# Patient Record
Sex: MALE | Race: WHITE | NOT HISPANIC OR LATINO | Employment: OTHER | URBAN - NONMETROPOLITAN AREA
[De-identification: names, ages, dates, MRNs, and addresses within clinical notes are randomized per-mention and may not be internally consistent; named-entity substitution may affect disease eponyms.]

---

## 2017-12-07 ENCOUNTER — CONSULT (OUTPATIENT)
Dept: GASTROENTEROLOGY | Facility: CLINIC | Age: 37
End: 2017-12-07

## 2017-12-07 VITALS
DIASTOLIC BLOOD PRESSURE: 88 MMHG | BODY MASS INDEX: 30.86 KG/M2 | HEIGHT: 70 IN | SYSTOLIC BLOOD PRESSURE: 134 MMHG | HEART RATE: 97 BPM | WEIGHT: 215.6 LBS | OXYGEN SATURATION: 94 %

## 2017-12-07 DIAGNOSIS — R13.10 DYSPHAGIA, UNSPECIFIED TYPE: ICD-10-CM

## 2017-12-07 DIAGNOSIS — R14.2 BELCHING: ICD-10-CM

## 2017-12-07 DIAGNOSIS — R10.13 DYSPEPSIA: ICD-10-CM

## 2017-12-07 DIAGNOSIS — Z87.19 HISTORY OF CONSTIPATION: ICD-10-CM

## 2017-12-07 DIAGNOSIS — R14.0 BLOATING: Primary | ICD-10-CM

## 2017-12-07 PROCEDURE — 99214 OFFICE O/P EST MOD 30 MIN: CPT | Performed by: PHYSICIAN ASSISTANT

## 2017-12-07 RX ORDER — LISINOPRIL 20 MG/1
20 TABLET ORAL 2 TIMES DAILY
COMMUNITY
End: 2023-02-17

## 2017-12-07 RX ORDER — RANITIDINE 300 MG/1
300 TABLET ORAL 2 TIMES DAILY
COMMUNITY
End: 2023-02-17

## 2017-12-07 RX ORDER — HYDROCHLOROTHIAZIDE 25 MG/1
25 TABLET ORAL DAILY
COMMUNITY
End: 2023-02-17

## 2017-12-07 NOTE — PROGRESS NOTES
Chief Complaint   Patient presents with   • Vomiting     Ananda Tobin is a 37 y.o. male who presents to the office today at the request of Dr. Solitario Guzman for evaluation of Vomiting.    HPI  In 2012, he had cholecystectomy due to abnormal HIDA. He was having severe bloating and belching at that time. Dyspepsia and borborygmi was present at that time as well. Cholecystectomy did not seem to help with any of his complaints.  He has been taking herbal tea and probiotic at night to help with bowel movements. He had colonoscopy in [place] in 2013 which 'showed IBS.' No history of EGD. There is no known family history of colon cancer or colon polyps.     He reports bloating and food sitting in his throat after a meal which are his biggest complaints now. He has been taking Ranitidine 300 mg BID. Denies dysphagia. Reports worse symptoms with spicy foods such as grippos and tacos. He feels as if there is something stuck in his esophagus. He will sometimes vomit with some relief.     Now, he reports that his bowel movements are occurring every day after starting the herbal tea recommended by his wife. He has 5-6 stools in the morning as long as he drinks the tea. Stools are described as #1 at first then #4 then eventually #6 each and every day. Approx 3 years ago, he started this tea because he became very ill but it did not seem to help with any of his other symptoms.     Review of Systems   Constitutional: Negative for chills, fatigue and fever.   HENT: Positive for congestion. Negative for sore throat, trouble swallowing and voice change.    Respiratory: Positive for cough. Negative for shortness of breath and wheezing.    Cardiovascular: Negative for chest pain, palpitations and leg swelling.   Gastrointestinal: Positive for abdominal distention, constipation, nausea and vomiting. Negative for abdominal pain, anal bleeding, blood in stool, diarrhea and rectal pain.   Endocrine: Negative for cold intolerance and  "heat intolerance.   Genitourinary: Negative for difficulty urinating, frequency and urgency.   Musculoskeletal: Negative for arthralgias, back pain and myalgias.   Skin: Negative for rash and wound.   Allergic/Immunologic: Positive for environmental allergies. Negative for food allergies.   Neurological: Negative for dizziness and headaches.   Hematological: Does not bruise/bleed easily.   Psychiatric/Behavioral: Negative for sleep disturbance. The patient is not nervous/anxious.        Past Medical History:   Diagnosis Date   • Hypertension        Past Surgical History:   Procedure Laterality Date   • CHOLECYSTECTOMY     • COLONOSCOPY     • HERNIA REPAIR         Family History   Problem Relation Age of Onset   • Hypertension Father    • Kidney disease Father        Social History   Substance Use Topics   • Smoking status: Never Smoker   • Smokeless tobacco: Never Used   • Alcohol use No       CURRENT MEDICATION:  •  hydrochlorothiazide (HYDRODIURIL) 25 MG tablet, Take 25 mg by mouth Daily., Disp: , Rfl:   •  lisinopril (PRINIVIL,ZESTRIL) 20 MG tablet, Take 20 mg by mouth 2 (Two) Times a Day., Disp: , Rfl:   •  Probiotic Product (PROBIOTIC ADVANCED PO), Take  by mouth., Disp: , Rfl:   •  raNITIdine (ZANTAC) 300 MG tablet, Take 300 mg by mouth 2 (Two) Times a Day., Disp: , Rfl:     ALLERGIES:  Review of patient's allergies indicates no known allergies.    VISIT VITALS:  /88  Pulse 97  Ht 177.8 cm (70\")  Wt 97.8 kg (215 lb 9.6 oz)  SpO2 94%  BMI 30.94 kg/m2    Physical Exam   Constitutional: He is oriented to person, place, and time. He appears well-developed and well-nourished. No distress.   HENT:   Head: Normocephalic and atraumatic.   Right Ear: External ear normal.   Left Ear: External ear normal.   Nose: Nose normal.   Mouth/Throat: Oropharynx is clear and moist.   Eyes: Conjunctivae and EOM are normal. Right eye exhibits no discharge. Left eye exhibits no discharge. No scleral icterus.   Neck: Normal " range of motion. Neck supple.   Cardiovascular: Normal rate, regular rhythm and normal heart sounds.  Exam reveals no gallop and no friction rub.    No murmur heard.  Pulmonary/Chest: Effort normal and breath sounds normal. No respiratory distress. He has no wheezes. He has no rales. He exhibits no tenderness.   Abdominal: Soft. Normal appearance and bowel sounds are normal. He exhibits no distension, no ascites and no mass. There is no tenderness. There is no rigidity and no guarding. No hernia.   Musculoskeletal: Normal range of motion. He exhibits no edema or deformity.   Neurological: He is alert and oriented to person, place, and time. He exhibits normal muscle tone. Coordination normal.   Skin: Skin is warm and dry. No rash noted. No erythema. No pallor.   Psychiatric: He has a normal mood and affect. His behavior is normal. Judgment and thought content normal.   Nursing note and vitals reviewed.      Assessment/Plan     1. Bloating    2. Belching    3. Dyspepsia    4. History of constipation    5. Dysphagia, unspecified type      Abdominal film has been ordered due to current complaints of bloating and belching.    He will need an esophagogastroduodenoscopy with possible dilatation of the esophagus performed with IV general sedation. All of the risks, benefits and alternatives of this procedure have been discussed with him, all of his questions have been answered and he has elected to proceed. He should follow up in the office after this procedure to discuss the results and further recommendations can be made at that time.          Return for follow up after procedure.      Electronically signed 12/20/2017 11:10 PM  Laly Galvez PA-C, [facility]

## 2018-01-09 PROBLEM — R10.13 DYSPEPSIA: Status: ACTIVE | Noted: 2018-01-09

## 2018-01-09 PROBLEM — R14.0 BLOATING: Status: ACTIVE | Noted: 2018-01-09

## 2018-01-09 PROBLEM — R13.10 DYSPHAGIA: Status: ACTIVE | Noted: 2018-01-09

## 2018-01-09 PROBLEM — R14.2 BELCHING: Status: ACTIVE | Noted: 2018-01-09

## 2018-01-09 PROBLEM — Z87.19 HISTORY OF CONSTIPATION: Status: ACTIVE | Noted: 2018-01-09

## 2018-01-16 ENCOUNTER — HOSPITAL ENCOUNTER (OUTPATIENT)
Dept: GENERAL RADIOLOGY | Facility: HOSPITAL | Age: 38
Discharge: HOME | End: 2018-01-16
Admitting: PHYSICIAN ASSISTANT

## 2018-01-16 DIAGNOSIS — R10.13 DYSPEPSIA: ICD-10-CM

## 2018-01-16 DIAGNOSIS — R14.0 BLOATING: ICD-10-CM

## 2018-01-16 DIAGNOSIS — Z87.19 HISTORY OF CONSTIPATION: ICD-10-CM

## 2018-01-16 DIAGNOSIS — R14.2 BELCHING: ICD-10-CM

## 2018-01-16 PROCEDURE — 74019 RADEX ABDOMEN 2 VIEWS: CPT

## 2018-01-16 PROCEDURE — 74019 RADEX ABDOMEN 2 VIEWS: CPT | Performed by: RADIOLOGY

## 2018-01-23 ENCOUNTER — TELEPHONE (OUTPATIENT)
Dept: GASTROENTEROLOGY | Facility: CLINIC | Age: 38
End: 2018-01-23

## 2018-01-23 NOTE — TELEPHONE ENCOUNTER
Spoke with patient and let him know his abdominal film was normal. He stated that he would call back later to reschedule his procedure.

## 2018-01-23 NOTE — TELEPHONE ENCOUNTER
I noticed patient did not complete EGD. His abdominal film was normal, no constipation noted. Would he like to reschedule procedure then sched f/u after to discuss?

## 2021-01-12 ENCOUNTER — HOSPITAL ENCOUNTER (OUTPATIENT)
Dept: CT IMAGING | Facility: HOSPITAL | Age: 41
Discharge: HOME | End: 2021-01-12
Admitting: PHYSICIAN ASSISTANT

## 2021-01-12 ENCOUNTER — TRANSCRIBE ORDERS (OUTPATIENT)
Dept: ADMINISTRATIVE | Facility: HOSPITAL | Age: 41
End: 2021-01-12

## 2021-01-12 DIAGNOSIS — R10.9 ABDOMINAL PAIN, UNSPECIFIED ABDOMINAL LOCATION: ICD-10-CM

## 2021-01-12 DIAGNOSIS — R31.0 GROSS HEMATURIA: Primary | ICD-10-CM

## 2021-01-12 DIAGNOSIS — R31.0 GROSS HEMATURIA: ICD-10-CM

## 2021-01-12 PROCEDURE — 74176 CT ABD & PELVIS W/O CONTRAST: CPT

## 2021-01-12 PROCEDURE — 74176 CT ABD & PELVIS W/O CONTRAST: CPT | Performed by: RADIOLOGY

## 2021-03-18 ENCOUNTER — BULK ORDERING (OUTPATIENT)
Dept: CASE MANAGEMENT | Facility: OTHER | Age: 41
End: 2021-03-18

## 2021-03-18 DIAGNOSIS — Z23 IMMUNIZATION DUE: ICD-10-CM

## 2022-05-11 ENCOUNTER — TRANSCRIBE ORDERS (OUTPATIENT)
Dept: ADMINISTRATIVE | Facility: HOSPITAL | Age: 42
End: 2022-05-11

## 2022-05-11 DIAGNOSIS — R74.8 ELEVATED LIVER ENZYMES: Primary | ICD-10-CM

## 2022-06-02 ENCOUNTER — HOSPITAL ENCOUNTER (OUTPATIENT)
Dept: ULTRASOUND IMAGING | Facility: HOSPITAL | Age: 42
Discharge: HOME | End: 2022-06-02
Admitting: EMERGENCY MEDICINE

## 2022-06-02 DIAGNOSIS — R74.8 ELEVATED LIVER ENZYMES: ICD-10-CM

## 2022-06-02 PROCEDURE — 76705 ECHO EXAM OF ABDOMEN: CPT | Performed by: RADIOLOGY

## 2022-06-02 PROCEDURE — 76705 ECHO EXAM OF ABDOMEN: CPT

## 2023-02-17 ENCOUNTER — OFFICE VISIT (OUTPATIENT)
Dept: UROLOGY | Facility: CLINIC | Age: 43
End: 2023-02-17
Payer: COMMERCIAL

## 2023-02-17 VITALS
BODY MASS INDEX: 30.72 KG/M2 | WEIGHT: 214.6 LBS | SYSTOLIC BLOOD PRESSURE: 122 MMHG | DIASTOLIC BLOOD PRESSURE: 76 MMHG | TEMPERATURE: 98 F | HEIGHT: 70 IN

## 2023-02-17 DIAGNOSIS — R79.89 LOW TESTOSTERONE IN MALE: Primary | ICD-10-CM

## 2023-02-17 PROCEDURE — 99204 OFFICE O/P NEW MOD 45 MIN: CPT | Performed by: NURSE PRACTITIONER

## 2023-02-17 RX ORDER — DICLOFENAC SODIUM 75 MG/1
1 TABLET, DELAYED RELEASE ORAL EVERY 12 HOURS SCHEDULED
COMMUNITY
Start: 2023-02-08

## 2023-02-17 RX ORDER — TIRZEPATIDE 2.5 MG/.5ML
INJECTION, SOLUTION SUBCUTANEOUS
COMMUNITY

## 2023-02-17 NOTE — PROGRESS NOTES
Office Visit Low Testosterone      Patient Name: Ananda Tobin  : 1980   MRN: [id number]     Chief Complaint:   Chief Complaint   Patient presents with   • Low testosterone       Referring Provider: Viki Woo PA-C    History of Present Illness: Ananda Tobin is a 42 y.o. male who presents today with low testosterone.  The serum test was collected due to the following complaints: fatigue  Having a lot of joint pain started arthritis medication     Low libido   yes  Low energy   yes  Poor sleep   no  Mental clarity issues  yes    History of depression? no  Erectile dysfunction?  no    Any potential interest in conception?  no    Objective      Review of System:   As noted in HPI    Past Medical History:   Past Medical History:   Diagnosis Date   • Hypertension        Past Surgical History:   Past Surgical History:   Procedure Laterality Date   • CHOLECYSTECTOMY     • COLONOSCOPY     • HERNIA REPAIR         Family History:   Family History   Problem Relation Age of Onset   • Hypertension Father    • Kidney disease Father        Social History:   Social History     Socioeconomic History   • Marital status:    Tobacco Use   • Smoking status: Never   • Smokeless tobacco: Never   Substance and Sexual Activity   • Alcohol use: No   • Drug use: No       Medications:     Current Outpatient Medications:   •  diclofenac (VOLTAREN) 75 MG EC tablet, Take 1 tablet by mouth Every 12 (Twelve) Hours., Disp: , Rfl:   •  Probiotic Product (PROBIOTIC ADVANCED PO), Take  by mouth., Disp: , Rfl:   •  Tirzepatide (Mounjaro) 2.5 MG/0.5ML solution pen-injector, Inject  under the skin into the appropriate area as directed., Disp: , Rfl:     Allergies:   No Known Allergies    Objective     Physical Exam:   Vital Signs:   Vitals:    23 1520   BP: 122/76   BP Location: Right arm   Patient Position: Sitting   Cuff Size: Adult   Temp: 98 °F (36.7 °C)   TempSrc: Temporal   Weight: 97.3 kg (214 lb 9.6 oz)  "  Height: 177.8 cm (70\")     Body mass index is 30.79 kg/m².     Constitutional: NAD, WDWN.   Neurological: A + O x 3    Psych: normal mood and affect        Labs  No results found for: TESTOSTERONE    No results found for: PSA    No results found for: WBC, HGB, HCT, MCV, PLT    Assessment / Plan    Assessment  Mr. Tobin is a 42 y.o. male with low testosterone.  Today we discussed the role testosterone plays for a male patient. I have indicated that low testosterone can be associated with metabolic syndrome, erectile dysfunction, coronary artery disease, diabetes, depression, osteoporosis, fatigue, low sex drive, poor sleep, high cholesterol, increased abdominal fat, muscle loss, irritability, hot flashes, and inability to concentrate.     Treatment options were discussed including SERMs, aromatase inhibitors, topical gel or patch, oral troches, nasal spray, testosterone injections every 2-3 weeks, long-acting injections every 10 weeks, and testosterone pellets placed in clinic every 4-6 months.    I explained the risks, benefits, and alternatives to testosterone therapies. I informed him of the potential SEs of chest and leg swelling, increased acne, change in mood, polycythemia, and worsening of undiagnosed prostate cancer.     Plan  1.  Repeat TT and free T  2.  LH, estradiol, and PSA ordered  3.  Patient will have labs drawn at the clinic in [place] hometown and have faxed to me  4.  After lab results confirm hypogonadism plan to start weekly testosterone therapy.    Follow Up:   Return in about 10 days (around 2/27/2023).    REYMUNDO Ayon, NP-C  [facility] [place]   "

## 2023-02-27 ENCOUNTER — OFFICE VISIT (OUTPATIENT)
Dept: UROLOGY | Facility: CLINIC | Age: 43
End: 2023-02-27
Payer: COMMERCIAL

## 2023-02-27 DIAGNOSIS — E29.1 HYPOGONADISM IN MALE: Primary | ICD-10-CM

## 2023-02-27 PROCEDURE — 99443 PR PHYS/QHP TELEPHONE EVALUATION 21-30 MIN: CPT | Performed by: NURSE PRACTITIONER

## 2023-02-27 RX ORDER — BLOOD PRESSURE TEST KIT
KIT MISCELLANEOUS
Qty: 100 EACH | Refills: 11 | Status: SHIPPED | OUTPATIENT
Start: 2023-02-27

## 2023-02-27 RX ORDER — TESTOSTERONE CYPIONATE 200 MG/ML
100 INJECTION, SOLUTION INTRAMUSCULAR
Qty: 2 ML | Refills: 0 | Status: SHIPPED | OUTPATIENT
Start: 2023-02-27 | End: 2023-04-07 | Stop reason: SDUPTHER

## 2023-02-27 NOTE — PROGRESS NOTES
Office Visit Established Male Patient     Patient Name: Ananda Tobin  : 1980   MRN: [id number]     Chief Complaint: No chief complaint on file.      History of Present Illness: Mr. Ananda Tobin is a 42 y.o. male who agrees to visit via telephone to discuss his repeat testosterone labs.  We discussed that at last visit and initially he had reported he did not have any issues but after going home and thinking about this he does report decreased libido and mild ED symptoms.      Subjective      Review of System:     Past Medical History:   Past Medical History:   Diagnosis Date   • Hypertension        Past Surgical History:   Past Surgical History:   Procedure Laterality Date   • CHOLECYSTECTOMY     • COLONOSCOPY     • HERNIA REPAIR         Family History:   Family History   Problem Relation Age of Onset   • Hypertension Father    • Kidney disease Father        Social History:   Social History     Socioeconomic History   • Marital status:    Tobacco Use   • Smoking status: Never   • Smokeless tobacco: Never   Substance and Sexual Activity   • Alcohol use: No   • Drug use: No       Medications:     Current Outpatient Medications:   •  diclofenac (VOLTAREN) 75 MG EC tablet, Take 1 tablet by mouth Every 12 (Twelve) Hours., Disp: , Rfl:   •  Probiotic Product (PROBIOTIC ADVANCED PO), Take  by mouth., Disp: , Rfl:   •  Tirzepatide (Mounjaro) 2.5 MG/0.5ML solution pen-injector, Inject  under the skin into the appropriate area as directed., Disp: , Rfl:     Allergies:   No Known Allergies    Objective     Physical Exam:   Vital Signs: There were no vitals filed for this visit.  There is no height or weight on file to calculate BMI.       Assessment / Plan      Assessment/Plan:   Diagnoses and all orders for this visit:    1. Hypogonadism in male (Primary)    42-year-old male with a history of low testosterone with repeated labs today have confirmed hypogonadism.  We discussed testosterone is 139  and his LH, estradiol, and PSA all within normal limits.  He would like to start a trial of testosterone cypionate.  We discussed starting dosage at 100 mg weekly on her previous visit we had discussed risks and side effects of testosterone therapy.  He wishes to proceed with a trial of testosterone cypionate    1. Start testosterone cypionate 100 mg weekly  2. Repeat TT and H&H 3 months prior to next visit    Total of 24 minutes spent via telephone with patient discussing lab results, testosterone therapy, and engaging the chart.    Follow Up:   No follow-ups on file.    REYMUNDO Ayon,NP-C  [facility] [place]

## 2023-04-07 DIAGNOSIS — E29.1 HYPOGONADISM IN MALE: ICD-10-CM

## 2023-04-07 RX ORDER — TESTOSTERONE CYPIONATE 200 MG/ML
100 INJECTION, SOLUTION INTRAMUSCULAR
Qty: 2 ML | Refills: 0 | Status: SHIPPED | OUTPATIENT
Start: 2023-04-07

## 2023-05-23 ENCOUNTER — TELEPHONE (OUTPATIENT)
Dept: UROLOGY | Facility: CLINIC | Age: 43
End: 2023-05-23
Payer: COMMERCIAL

## 2023-05-24 ENCOUNTER — OFFICE VISIT (OUTPATIENT)
Dept: UROLOGY | Facility: CLINIC | Age: 43
End: 2023-05-24
Payer: COMMERCIAL

## 2023-05-24 DIAGNOSIS — E29.1 HYPOGONADISM IN MALE: Primary | ICD-10-CM

## 2023-05-24 PROCEDURE — 99442 PR PHYS/QHP TELEPHONE EVALUATION 11-20 MIN: CPT | Performed by: NURSE PRACTITIONER

## 2023-05-24 NOTE — PROGRESS NOTES
Patient agrees to visit via telephone    We discussed recent total testosterone levels have improved from 153 to 324 and free testosterone is 29%, patient is feeling well has less fatigue on testosterone cypionate.  We discussed I did not receive hematocrit and hemoglobin labs this is an important component when doing testosterone therapy to confirm patient is not having issues with polycythemia.  He will call the lab to see if they had those results and get them sent to our office.  Once receiving his hematocrit and hemoglobin if he does not have polycythemia we will consider increasing his dosage to 200 mg weekly of testosterone cypionate to get him in a more therapeutic range    Plan: Patient will follow-up in 6 months with repeat TT, hematocrit and hemoglobin    I spent a total of 11 minutes via telephone with the patient and the chart engaging in data gathering and interpretation, patient interaction, as well as counseling on the risks, benefits, and alternatives of the therapy and coordinating care.

## 2023-07-19 DIAGNOSIS — E29.1 HYPOGONADISM IN MALE: ICD-10-CM

## 2023-08-28 DIAGNOSIS — E29.1 HYPOGONADISM IN MALE: ICD-10-CM

## 2023-08-28 RX ORDER — TESTOSTERONE CYPIONATE 200 MG/ML
INJECTION, SOLUTION INTRAMUSCULAR
Qty: 2 ML | Refills: 0 | Status: SHIPPED | OUTPATIENT
Start: 2023-08-28

## 2023-09-29 RX ORDER — TESTOSTERONE CYPIONATE 200 MG/ML
INJECTION, SOLUTION INTRAMUSCULAR
Qty: 2 ML | Refills: 0 | Status: SHIPPED | OUTPATIENT
Start: 2023-09-29

## 2023-09-29 NOTE — TELEPHONE ENCOUNTER
Caller: [address] [place] [address] - [phone] [facility] [phone]    Relationship: PT    Best call back number: [phone]    Requested Prescriptions:   Requested Prescriptions     Pending Prescriptions Disp Refills    Testosterone Cypionate (DEPOTESTOTERONE CYPIONATE) 200 MG/ML injection [Pharmacy Med Name: testosterone cypionate 200 mg/mL intramuscular oil] 2 mL 0     Sig: inject 0.5ml INTO THE MUSCLE AS directed EVERY 7 DAYS        Pharmacy where request should be sent: [address] [place] [address] - [phone] [facility] [phone] FX     Last office visit with prescribing clinician: 5/24/2023   Last telemedicine visit with prescribing clinician: Visit date not found   Next office visit with prescribing clinician: 8/28/2023     Additional details provided by patient: PT IS COMPLETELY OUT OF MEDICATION     Does the patient have less than a 3 day supply:  [x] Yes  [] No    Would you like a call back once the refill request has been completed: [x] Yes [] No    If the office needs to give you a call back, can they leave a voicemail: [x] Yes [] No    Eulalio Reina Rep   09/29/23 14:28 EDT

## 2023-11-01 DIAGNOSIS — E29.1 HYPOGONADISM IN MALE: ICD-10-CM

## 2023-11-01 RX ORDER — TESTOSTERONE CYPIONATE 200 MG/ML
INJECTION, SOLUTION INTRAMUSCULAR
Qty: 2 ML | Refills: 0 | Status: SHIPPED | OUTPATIENT
Start: 2023-11-01

## 2023-11-21 ENCOUNTER — TELEPHONE (OUTPATIENT)
Dept: UROLOGY | Facility: CLINIC | Age: 43
End: 2023-11-21
Payer: COMMERCIAL

## 2023-11-28 ENCOUNTER — LAB (OUTPATIENT)
Dept: LAB | Facility: HOSPITAL | Age: 43
End: 2023-11-28
Payer: COMMERCIAL

## 2023-11-28 ENCOUNTER — OFFICE VISIT (OUTPATIENT)
Dept: UROLOGY | Facility: CLINIC | Age: 43
End: 2023-11-28
Payer: COMMERCIAL

## 2023-11-28 VITALS
SYSTOLIC BLOOD PRESSURE: 118 MMHG | DIASTOLIC BLOOD PRESSURE: 76 MMHG | HEIGHT: 70 IN | WEIGHT: 214 LBS | BODY MASS INDEX: 30.64 KG/M2

## 2023-11-28 DIAGNOSIS — E29.1 HYPOGONADISM IN MALE: Primary | ICD-10-CM

## 2023-11-28 LAB
HCT VFR BLD AUTO: 50 % (ref 37.5–51)
HGB BLD-MCNC: 16.9 G/DL (ref 13–17.7)
PSA SERPL-MCNC: 1.03 NG/ML (ref 0–4)
TESTOST SERPL-MCNC: 1228 NG/DL (ref 249–836)

## 2023-11-28 PROCEDURE — 36415 COLL VENOUS BLD VENIPUNCTURE: CPT | Performed by: NURSE PRACTITIONER

## 2023-11-28 PROCEDURE — 85018 HEMOGLOBIN: CPT | Performed by: NURSE PRACTITIONER

## 2023-11-28 PROCEDURE — 84403 ASSAY OF TOTAL TESTOSTERONE: CPT | Performed by: NURSE PRACTITIONER

## 2023-11-28 PROCEDURE — 85014 HEMATOCRIT: CPT | Performed by: NURSE PRACTITIONER

## 2023-11-28 PROCEDURE — 84153 ASSAY OF PSA TOTAL: CPT | Performed by: NURSE PRACTITIONER

## 2023-11-28 RX ORDER — NEEDLES, DISPOSABLE 25GX5/8"
NEEDLE, DISPOSABLE MISCELLANEOUS
Qty: 50 EACH | Refills: 2 | Status: SHIPPED | OUTPATIENT
Start: 2023-11-28

## 2023-11-28 NOTE — PROGRESS NOTES
"       Office Visit Established Male Patient     Patient Name: Ananda Tobin  : 1980   MRN: [id number]     Chief Complaint:   Chief Complaint   Patient presents with    Follow-up     hypogonadism       History of Present Illness: Mr. Ananda Tobin is a 43 y.o. male who presents today for follow up with hypogonadism.    night.  His last injection  Helping with fatigue and lost 40 lbs since starting testosterone feels better in general       Subjective      Review of System:   As noted in HPI    Past Medical History:   Past Medical History:   Diagnosis Date    Hypertension        Past Surgical History:   Past Surgical History:   Procedure Laterality Date    CHOLECYSTECTOMY      COLONOSCOPY      HERNIA REPAIR         Family History:   Family History   Problem Relation Age of Onset    Hypertension Father     Kidney disease Father        Social History:   Social History     Socioeconomic History    Marital status:    Tobacco Use    Smoking status: Never    Smokeless tobacco: Never   Substance and Sexual Activity    Alcohol use: No    Drug use: No       Medications:     Current Outpatient Medications:     Needle, Disp, 18G X 1-1/2\" misc, Use for drawing up the medication, Disp: 50 each, Rfl: 3    Syringe, Disposable, 3 ML misc, Use 1 syringe 1 (One) Time Per Week., Disp: 100 each, Rfl: 11    Alcohol Swabs pads, Clean area prior to injection, Disp: 100 each, Rfl: 11    diclofenac (VOLTAREN) 75 MG EC tablet, Take 1 tablet by mouth Every 12 (Twelve) Hours., Disp: , Rfl:     Needle, Disp, (BD Disp Needle) 23G X 1\" misc, Use to inject one time weekly, Disp: 50 each, Rfl: 2    Probiotic Product (PROBIOTIC ADVANCED PO), Take  by mouth., Disp: , Rfl:     Testosterone Cypionate (DEPOTESTOTERONE CYPIONATE) 200 MG/ML injection, inject 0.5ml INTO THE MUSCLE every 7 DAYS, Disp: 2 mL, Rfl: 0    Tirzepatide (Mounjaro) 2.5 MG/0.5ML solution pen-injector, Inject  under the skin into the appropriate area as " "directed., Disp: , Rfl:     Allergies:   No Known Allergies    Objective     Physical Exam:   Vital Signs:   Vitals:    11/28/23 0838   BP: 118/76   BP Location: Left arm   Patient Position: Sitting   Cuff Size: Adult   Weight: 97.1 kg (214 lb)   Height: 177.8 cm (70\")     Body mass index is 30.71 kg/m².     Physical Exam  Constitutional: NAD, WDWN.   Neurological: A + O x 3.   Psychiatric:  Normal mood and affect    Labs  Brief Urine Lab Results       None            No results found for: \"GLUCOSE\", \"CALCIUM\", \"NA\", \"K\", \"CO2\", \"CL\", \"BUN\", \"CREATININE\", \"EGFRIFAFRI\", \"EGFRIFNONA\", \"BCR\", \"ANIONGAP\"    No results found for: \"WBC\", \"HGB\", \"HCT\", \"MCV\", \"PLT\"         Radiographic Studies  No Images in the past 120 days found..    I have reviewed the above labs and imaging.     Assessment / Plan      Assessment/Plan:   Diagnoses and all orders for this visit:    1. Hypogonadism in male (Primary)  -     Hemoglobin & Hematocrit, Blood  -     Testosterone  -     PSA DIAGNOSTIC  -     Syringe, Disposable, 3 ML misc; Use 1 syringe 1 (One) Time Per Week.  Dispense: 100 each; Refill: 11  -     Needle, Disp, 18G X 1-1/2\" misc; Use for drawing up the medication  Dispense: 50 each; Refill: 3  -     Needle, Disp, (BD Disp Needle) 23G X 1\" misc; Use to inject one time weekly  Dispense: 50 each; Refill: 2    43-year-old male with hypogonadism here for 6-month follow-up feels overall he is improving on testosterone cypionate would like to consider an increase in dosage if his testosterone labs are still on the lower end of normal limits.    Obtain TT, PSA, hematocrit and hemoglobin today  Obtain TT hematocrit and hemoglobin 6 months  Consider increasing testosterone cypionate to 200 mg weekly per lab results    Follow Up:   Return in about 6 months (around 5/28/2024) for Follow up Kirby.    REYMUNDO Ayon,NP-C  [facility] [place]   "

## 2023-12-04 DIAGNOSIS — E29.1 HYPOGONADISM IN MALE: ICD-10-CM

## 2023-12-04 RX ORDER — TESTOSTERONE CYPIONATE 200 MG/ML
INJECTION, SOLUTION INTRAMUSCULAR
Qty: 2 ML | Refills: 0 | Status: SHIPPED | OUTPATIENT
Start: 2023-12-04

## 2024-01-16 DIAGNOSIS — E29.1 HYPOGONADISM IN MALE: ICD-10-CM

## 2024-01-16 RX ORDER — TESTOSTERONE CYPIONATE 200 MG/ML
INJECTION, SOLUTION INTRAMUSCULAR
Qty: 2 ML | Refills: 0 | Status: SHIPPED | OUTPATIENT
Start: 2024-01-16

## 2024-02-29 DIAGNOSIS — E29.1 HYPOGONADISM IN MALE: ICD-10-CM

## 2024-02-29 RX ORDER — TESTOSTERONE CYPIONATE 200 MG/ML
100 INJECTION, SOLUTION INTRAMUSCULAR
Qty: 2 ML | Refills: 0 | Status: SHIPPED | OUTPATIENT
Start: 2024-02-29

## 2024-04-28 DIAGNOSIS — E29.1 HYPOGONADISM IN MALE: ICD-10-CM

## 2024-04-29 RX ORDER — NEEDLES, DISPOSABLE 25GX5/8"
NEEDLE, DISPOSABLE MISCELLANEOUS
Qty: 50 EACH | Refills: 2 | Status: SHIPPED | OUTPATIENT
Start: 2024-04-29

## 2024-04-29 RX ORDER — TESTOSTERONE CYPIONATE 200 MG/ML
100 INJECTION, SOLUTION INTRAMUSCULAR
Qty: 2 ML | Refills: 0 | Status: SHIPPED | OUTPATIENT
Start: 2024-04-29

## 2024-05-21 DIAGNOSIS — E29.1 HYPOGONADISM IN MALE: Primary | ICD-10-CM

## 2024-05-28 ENCOUNTER — OFFICE VISIT (OUTPATIENT)
Dept: UROLOGY | Facility: CLINIC | Age: 44
End: 2024-05-28
Payer: COMMERCIAL

## 2024-05-28 VITALS
HEIGHT: 70 IN | BODY MASS INDEX: 30.64 KG/M2 | SYSTOLIC BLOOD PRESSURE: 130 MMHG | WEIGHT: 214 LBS | DIASTOLIC BLOOD PRESSURE: 82 MMHG

## 2024-05-28 DIAGNOSIS — E29.1 HYPOGONADISM IN MALE: Primary | ICD-10-CM

## 2024-05-28 PROCEDURE — 99213 OFFICE O/P EST LOW 20 MIN: CPT | Performed by: NURSE PRACTITIONER

## 2024-05-28 RX ORDER — SYRINGE WITH NEEDLE, 1 ML 25GX5/8"
SYRINGE, EMPTY DISPOSABLE MISCELLANEOUS
COMMUNITY
Start: 2024-04-29

## 2024-05-28 RX ORDER — MECOBALAMIN 5000 MCG
TABLET,DISINTEGRATING ORAL
COMMUNITY
Start: 2013-01-31

## 2024-05-28 NOTE — PROGRESS NOTES
"       Office Visit Established Male Patient     Patient Name: Ananda Tobin  : 1980   MRN: [id number]     Chief Complaint:   Chief Complaint   Patient presents with    Hypogonadism       History of Present Illness: Mr. Ananda Tobin is a 43 y.o. male who presents today for follow up with hypogonadism.  Injected on Tuesday and had to get labs done on Wednesday this time   Doing well no complainst of symptoms or siede effects  No difficulty with injecting         Subjective      Review of System:   As noted in HPI    Past Medical History:   Past Medical History:   Diagnosis Date    Hypertension        Past Surgical History:   Past Surgical History:   Procedure Laterality Date    CHOLECYSTECTOMY      COLONOSCOPY      HERNIA REPAIR         Family History:   Family History   Problem Relation Age of Onset    Hypertension Father     Kidney disease Father        Social History:   Social History     Socioeconomic History    Marital status:    Tobacco Use    Smoking status: Never     Passive exposure: Never    Smokeless tobacco: Never   Vaping Use    Vaping status: Never Used   Substance and Sexual Activity    Alcohol use: No    Drug use: No       Medications:     Current Outpatient Medications:     B-D 3CC LUER-VENKATESH SYR 36VG0-5/2 18G X 1-1/2\" 3 ML misc, USE AS directed TO draw UP testosterone ONCE WEEKLY AS DIRECTED, Disp: , Rfl:     B-D 3CC LUER-VENKATESH SYR 25GX5/8\" 25G X 5/8\" 3 ML misc, USE AS directed TO inject testosterone ONCE WEEKLY AS DIRECTED, Disp: , Rfl:     lansoprazole (Prevacid) 15 MG capsule, Take  by mouth., Disp: , Rfl:     Alcohol Swabs pads, Clean area prior to injection, Disp: 100 each, Rfl: 11    Needle, Disp, (BD Disp Needle) 23G X 1\" misc, Use to inject one time weekly, Disp: 50 each, Rfl: 2    Needle, Disp, 18G X 1-1/2\" misc, Use for drawing up the medication, Disp: 50 each, Rfl: 3    Probiotic Product (PROBIOTIC ADVANCED PO), Take  by mouth., Disp: , Rfl:     Syringe, Disposable, 3 ML " "misc, Use 1 syringe 1 (One) Time Per Week., Disp: 100 each, Rfl: 11    Testosterone Cypionate (DEPOTESTOTERONE CYPIONATE) 200 MG/ML injection, Inject 0.5 mL into the appropriate muscle as directed by prescriber Every 7 (Seven) Days. Please call for refills with last injection, Disp: 2 mL, Rfl: 0    Tirzepatide (Mounjaro) 2.5 MG/0.5ML solution pen-injector, Inject  under the skin into the appropriate area as directed., Disp: , Rfl:     Allergies:   No Known Allergies    Objective     Physical Exam:   Vital Signs:   Vitals:    05/28/24 0908   BP: 130/82   BP Location: Left arm   Patient Position: Sitting   Cuff Size: Adult   Weight: 97.1 kg (214 lb)   Height: 177.8 cm (70\")     Body mass index is 30.71 kg/m².     Physical Exam  Vitals and nursing note reviewed.   Constitutional:       General: He is not in acute distress.     Appearance: Normal appearance. He is normal weight. He is not ill-appearing.   Pulmonary:      Effort: Pulmonary effort is normal.   Skin:     General: Skin is warm and dry.   Neurological:      General: No focal deficit present.      Mental Status: He is alert and oriented to person, place, and time.   Psychiatric:         Mood and Affect: Mood normal.         Behavior: Behavior normal.        Labs  Brief Urine Lab Results       None            No results found for: \"GLUCOSE\", \"CALCIUM\", \"NA\", \"K\", \"CO2\", \"CL\", \"BUN\", \"CREATININE\", \"EGFRIFAFRI\", \"EGFRIFNONA\", \"BCR\", \"ANIONGAP\"    Lab Results   Component Value Date    HGB 16.9 11/28/2023    HCT 50.0 11/28/2023            Radiographic Studies  No Images in the past 120 days found..    I have reviewed the above labs and imaging.       Assessment / Plan      Assessment/Plan:   Diagnoses and all orders for this visit:    1. Hypogonadism in male (Primary)    42 yo male here to follow-up with hypogonadism he is doing well not experiencing symptoms on TRT or side effects.  Labs are pending we discussed no plans for dosage change.  Will have patient " follow-up in 6 months with routine TRT labs.  We also discussed if hematocrit comes back greater than 50 will have him start therapeutic phlebotomy he voiced understanding.    Continue testosterone cypionate 100 mg weekly  Obtain TT, hematocrit and hemoglobin 6-months  Anticipate therapeutic phlebotomy pending labs    Follow Up:   Return in about 6 months (around 11/28/2024) for Follow up Kirby.    REYMUNDO Ayon,NP-C  [facility] [place]

## 2024-05-31 ENCOUNTER — TELEPHONE (OUTPATIENT)
Dept: UROLOGY | Facility: CLINIC | Age: 44
End: 2024-05-31

## 2024-05-31 NOTE — TELEPHONE ENCOUNTER
[facility] received a fax that requires your attention. The document has been indexed to the patient’s chart for your review.      Reason for sending: NEEDS REVIEW    Documents Description: LABS    Name of Sender: [facility]    Date Indexed: 05/31/24

## 2024-06-03 DIAGNOSIS — E29.1 HYPOGONADISM IN MALE: ICD-10-CM

## 2024-06-03 RX ORDER — TESTOSTERONE CYPIONATE 200 MG/ML
100 INJECTION, SOLUTION INTRAMUSCULAR
Qty: 2 ML | Refills: 0 | Status: SHIPPED | OUTPATIENT
Start: 2024-06-03

## 2024-07-01 DIAGNOSIS — E29.1 HYPOGONADISM IN MALE: ICD-10-CM

## 2024-07-02 RX ORDER — TESTOSTERONE CYPIONATE 200 MG/ML
100 INJECTION, SOLUTION INTRAMUSCULAR
Qty: 2 ML | Refills: 0 | Status: SHIPPED | OUTPATIENT
Start: 2024-07-02

## 2024-08-05 DIAGNOSIS — E29.1 HYPOGONADISM IN MALE: ICD-10-CM

## 2024-08-06 RX ORDER — TESTOSTERONE CYPIONATE 200 MG/ML
100 INJECTION, SOLUTION INTRAMUSCULAR
Qty: 2 ML | Refills: 0 | Status: SHIPPED | OUTPATIENT
Start: 2024-08-06

## 2024-08-21 DIAGNOSIS — E29.1 HYPOGONADISM IN MALE: ICD-10-CM

## 2024-08-21 RX ORDER — NEEDLES, DISPOSABLE 25GX5/8"
NEEDLE, DISPOSABLE MISCELLANEOUS
Qty: 50 EACH | Refills: 2 | Status: SHIPPED | OUTPATIENT
Start: 2024-08-21

## 2024-08-21 RX ORDER — TESTOSTERONE CYPIONATE 200 MG/ML
100 INJECTION, SOLUTION INTRAMUSCULAR
Qty: 2 ML | Refills: 0 | Status: SHIPPED | OUTPATIENT
Start: 2024-08-21

## 2024-09-30 DIAGNOSIS — E29.1 HYPOGONADISM IN MALE: ICD-10-CM

## 2024-10-01 RX ORDER — TESTOSTERONE CYPIONATE 200 MG/ML
100 INJECTION, SOLUTION INTRAMUSCULAR
Qty: 2 ML | Refills: 0 | Status: SHIPPED | OUTPATIENT
Start: 2024-10-01

## 2024-10-01 RX ORDER — NEEDLES, DISPOSABLE 25GX5/8"
NEEDLE, DISPOSABLE MISCELLANEOUS
Qty: 50 EACH | Refills: 2 | Status: SHIPPED | OUTPATIENT
Start: 2024-10-01

## 2024-12-09 ENCOUNTER — HOSPITAL ENCOUNTER (OUTPATIENT)
Facility: HOSPITAL | Age: 44
Discharge: HOME | End: 2024-12-09
Admitting: CHIROPRACTOR
Payer: COMMERCIAL

## 2024-12-09 DIAGNOSIS — M25.511 CHRONIC RIGHT SHOULDER PAIN: ICD-10-CM

## 2024-12-09 DIAGNOSIS — G89.29 CHRONIC RIGHT SHOULDER PAIN: ICD-10-CM

## 2024-12-09 PROCEDURE — 73030 X-RAY EXAM OF SHOULDER: CPT | Performed by: RADIOLOGY

## 2024-12-09 PROCEDURE — 73030 X-RAY EXAM OF SHOULDER: CPT

## 2024-12-10 ENCOUNTER — OFFICE VISIT (OUTPATIENT)
Dept: UROLOGY | Facility: CLINIC | Age: 44
End: 2024-12-10
Payer: COMMERCIAL

## 2024-12-10 ENCOUNTER — TELEPHONE (OUTPATIENT)
Dept: UROLOGY | Facility: CLINIC | Age: 44
End: 2024-12-10

## 2024-12-10 VITALS
DIASTOLIC BLOOD PRESSURE: 84 MMHG | WEIGHT: 214 LBS | SYSTOLIC BLOOD PRESSURE: 124 MMHG | OXYGEN SATURATION: 98 % | BODY MASS INDEX: 30.64 KG/M2 | TEMPERATURE: 97.4 F | HEART RATE: 79 BPM | HEIGHT: 70 IN

## 2024-12-10 DIAGNOSIS — E29.1 HYPOGONADISM IN MALE: ICD-10-CM

## 2024-12-10 RX ORDER — TESTOSTERONE CYPIONATE 200 MG/ML
100 INJECTION, SOLUTION INTRAMUSCULAR
Qty: 4 ML | Refills: 0 | Status: SHIPPED | OUTPATIENT
Start: 2024-12-10

## 2024-12-10 RX ORDER — NEEDLES, DISPOSABLE 25GX5/8"
NEEDLE, DISPOSABLE MISCELLANEOUS
Qty: 50 EACH | Refills: 2 | Status: SHIPPED | OUTPATIENT
Start: 2024-12-10

## 2024-12-10 NOTE — TELEPHONE ENCOUNTER
Spoke with patient and I informed him that he can come in on Wednesday morning before 10:00 AM    H.Leonidas BARRETO

## 2024-12-10 NOTE — PROGRESS NOTES
"       Office Visit Follow Up      Patient Name: Ananda Tobin  : 1980   MRN: [id number]     Chief Complaint:   Chief Complaint   Patient presents with    Follow-up     Hypogonadism in male        Referring Provider: No ref. provider found    History of Present Illness: Ananda Tobin is a 44 y.o. male who presents today for follow up with hypogonadism  Has stopped his testosterone because he is trying to see if he can be a donor for his fathers kidney transplant  He brought in a Lithol[facility] done by donation center and he has worked on increasing his fluid intake, decreasing sodium in his diet to try and be available for this.  He did talk to  and they discussed testosterone would not affect this.  He has decided he will start his testosterone back.      Subjective      Review of System:   As noted in HPI    Medications:     Current Outpatient Medications:     Alcohol Swabs pads, Clean area prior to injection, Disp: 100 each, Rfl: 11    B-D 3CC LUER-VENKATESH SYR 25GX5/8\" 25G X 5/8\" 3 ML misc, USE AS directed TO inject testosterone ONCE WEEKLY AS DIRECTED, Disp: , Rfl:     Needle, Disp, (BD Disp Needle) 23G X 1\" misc, Use to inject one time weekly, Disp: 50 each, Rfl: 2    Needle, Disp, 18G X 1-1/2\" misc, Use for drawing up the medication, Disp: 50 each, Rfl: 3    Probiotic Product (PROBIOTIC ADVANCED PO), Take  by mouth., Disp: , Rfl:     Syringe, Disposable, 3 ML misc, Use 1 syringe 1 (One) Time Per Week., Disp: 100 each, Rfl: 11    Testosterone Cypionate (DEPOTESTOTERONE CYPIONATE) 200 MG/ML injection, Inject 0.5 mL into the appropriate muscle as directed by prescriber Every 7 (Seven) Days. Waste remainder in vial. Please call for refills with last injection, Disp: 4 mL, Rfl: 0    Tirzepatide (Mounjaro) 2.5 MG/0.5ML solution pen-injector, Inject  under the skin into the appropriate area as directed., Disp: , Rfl:     B-D 3CC LUER-VENKATESH SYR 97ZQ8-9/2 18G X 1-1/2\" 3 ML misc, USE AS directed TO draw UP " "testosterone ONCE WEEKLY AS DIRECTED, Disp: , Rfl:     lansoprazole (Prevacid) 15 MG capsule, Take  by mouth., Disp: , Rfl:     Allergies:   No Known Allergies    Objective     Physical Exam:   Vital Signs:   Vitals:    12/10/24 0928   BP: 124/84   BP Location: Left arm   Patient Position: Sitting   Cuff Size: Adult   Pulse: 79   Temp: 97.4 °F (36.3 °C)   TempSrc: Temporal   SpO2: 98%   Weight: 97.1 kg (214 lb)   Height: 177.8 cm (70\")     Body mass index is 30.71 kg/m².     Physical Exam  Vitals and nursing note reviewed.   Constitutional:       General: He is not in acute distress.     Appearance: Normal appearance. He is not ill-appearing.   Pulmonary:      Effort: Pulmonary effort is normal. No respiratory distress.   Skin:     General: Skin is warm and dry.   Neurological:      General: No focal deficit present.      Mental Status: He is alert and oriented to person, place, and time.   Psychiatric:         Mood and Affect: Mood normal.         Behavior: Behavior normal.          Labs:   Brief Urine Lab Results  (Last result in the past 365 days)        Color   Clarity   Blood   Leuk Est   Nitrite   Protein   CREAT   Urine HCG        07/09/24 0753 Yellow   Clear     Negative                            No results found for: \"GLUCOSE\", \"CALCIUM\", \"NA\", \"K\", \"CO2\", \"CL\", \"BUN\", \"CREATININE\", \"EGFRIFAFRI\", \"EGFRIFNONA\", \"BCR\", \"ANIONGAP\"    Lab Results   Component Value Date    WBC 8.61 07/09/2024    HGB 17.3 07/09/2024    HCT 53.1 (H) 07/09/2024    MCV 89 07/09/2024     07/09/2024       Images:   XR Shoulder 2+ View Bilateral    Result Date: 12/9/2024  1.  Degenerative changes of the right greater than left AC joints. 2.  No acute osseous or articular abnormality.  This report was finalized on 12/9/2024 11:11 AM by Dr. Florentin Black MD.          Assessment / Plan      Assessment/Plan:   Diagnoses and all orders for this visit:    1. Hypogonadism in male  -     Testosterone Cypionate (DEPOTESTOTERONE " "CYPIONATE) 200 MG/ML injection; Inject 0.5 mL into the appropriate muscle as directed by prescriber Every 7 (Seven) Days. Waste remainder in vial. Please call for refills with last injection  Dispense: 4 mL; Refill: 0  -     Syringe, Disposable, 3 ML misc; Use 1 syringe 1 (One) Time Per Week.  Dispense: 100 each; Refill: 11  -     Needle, Disp, 18G X 1-1/2\" misc; Use for drawing up the medication  Dispense: 50 each; Refill: 3  -     Needle, Disp, (BD Disp Needle) 23G X 1\" misc; Use to inject one time weekly  Dispense: 50 each; Refill: 2  -     Estradiol; Future  -     Hemoglobin & Hematocrit, Blood; Future  -     Testosterone; Future    Other orders  -     LABS SCANNED    44-year-old male here to follow-up with TRT he has been off testosterone for approximately 1 month due to testing to become kidney donor for his father.  He can feel a significant difference without his testosterone.  We reviewed labs are still therapeutic but on the low end.  He will restart testosterone cypionate 100 mg weekly and follow-up with labs in 6 months.    Total Testosterone: 392  Hematocrit: 49.2  Hemoglobin: 16.3     Patient reviewed and signed zero tolerance policy, [facility] testosterone therapy policy, and FAQs of testosterone replacement therapy.  All questions were answered and patient agreed to follow policy.  Patient was given signed copies of zero tolerance policy, [facility] testosterone therapy policy, and FAQs of testosterone replacement therapy for reference.      Restart testosterone cypionate 100 mg weekly  Obtain TT, estradiol, hematocrit and hemoglobin 6 months    Follow Up:   Return in about 6 months (around 6/10/2025) for Follow up Alba Grimes APRN, NP-C  [facility]    "

## 2024-12-10 NOTE — TELEPHONE ENCOUNTER
Provider: REMEDIOS HART    Caller: Ananda Tobin    Relationship to Patient: Self    Reason for Call: PT CALLED BACK TO RECONFIRM THE DAYS THAT HE CAN COME INTO THE OFFICE TO COMPLETE LABS.    PLEASE CALL BACK TO CONFIRM. OK TO LVM.    When was the patient last seen: 12/10/24

## 2024-12-18 ENCOUNTER — TRANSCRIBE ORDERS (OUTPATIENT)
Dept: ADMINISTRATIVE | Facility: HOSPITAL | Age: 44
End: 2024-12-18
Payer: COMMERCIAL

## 2024-12-18 DIAGNOSIS — M25.511 RIGHT SHOULDER PAIN, UNSPECIFIED CHRONICITY: Primary | ICD-10-CM

## 2024-12-23 ENCOUNTER — HOSPITAL ENCOUNTER (OUTPATIENT)
Dept: MRI IMAGING | Facility: HOSPITAL | Age: 44
Discharge: HOME | End: 2024-12-23
Admitting: CHIROPRACTOR
Payer: COMMERCIAL

## 2024-12-23 DIAGNOSIS — M25.511 RIGHT SHOULDER PAIN, UNSPECIFIED CHRONICITY: ICD-10-CM

## 2024-12-23 PROCEDURE — 73221 MRI JOINT UPR EXTREM W/O DYE: CPT

## 2024-12-23 PROCEDURE — 73221 MRI JOINT UPR EXTREM W/O DYE: CPT | Performed by: RADIOLOGY

## 2025-01-15 ENCOUNTER — OFFICE VISIT (OUTPATIENT)
Dept: CARDIOLOGY | Facility: CLINIC | Age: 45
End: 2025-01-15
Payer: COMMERCIAL

## 2025-01-15 VITALS
WEIGHT: 183.6 LBS | SYSTOLIC BLOOD PRESSURE: 120 MMHG | OXYGEN SATURATION: 96 % | DIASTOLIC BLOOD PRESSURE: 78 MMHG | HEIGHT: 70 IN | HEART RATE: 71 BPM | BODY MASS INDEX: 26.28 KG/M2

## 2025-01-15 DIAGNOSIS — R07.2 PRECORDIAL PAIN: Primary | ICD-10-CM

## 2025-01-15 PROCEDURE — 93000 ELECTROCARDIOGRAM COMPLETE: CPT | Performed by: INTERNAL MEDICINE

## 2025-01-15 PROCEDURE — 99203 OFFICE O/P NEW LOW 30 MIN: CPT | Performed by: INTERNAL MEDICINE

## 2025-01-15 RX ORDER — NITROGLYCERIN 0.4 MG/1
0.8 TABLET SUBLINGUAL
OUTPATIENT
Start: 2025-01-15

## 2025-01-15 RX ORDER — METOPROLOL TARTRATE 1 MG/ML
5 INJECTION, SOLUTION INTRAVENOUS
OUTPATIENT
Start: 2025-01-15

## 2025-01-15 RX ORDER — METOPROLOL TARTRATE 25 MG/1
200 TABLET, FILM COATED ORAL ONCE
OUTPATIENT
Start: 2025-01-15 | End: 2025-01-15

## 2025-01-15 RX ORDER — SODIUM CHLORIDE 9 MG/ML
40 INJECTION, SOLUTION INTRAVENOUS AS NEEDED
OUTPATIENT
Start: 2025-01-15

## 2025-01-15 RX ORDER — SODIUM CHLORIDE 0.9 % (FLUSH) 0.9 %
10 SYRINGE (ML) INJECTION EVERY 12 HOURS SCHEDULED
OUTPATIENT
Start: 2025-01-15

## 2025-01-15 RX ORDER — TIRZEPATIDE 7.5 MG/.5ML
7.5 INJECTION, SOLUTION SUBCUTANEOUS WEEKLY
COMMUNITY
Start: 2025-01-13

## 2025-01-15 RX ORDER — METOPROLOL TARTRATE 50 MG
50 TABLET ORAL
OUTPATIENT
Start: 2025-01-15

## 2025-01-15 RX ORDER — METOPROLOL TARTRATE 25 MG/1
50 TABLET, FILM COATED ORAL ONCE
OUTPATIENT
Start: 2025-01-15

## 2025-01-15 RX ORDER — NITROGLYCERIN 0.4 MG/1
0.4 TABLET SUBLINGUAL
OUTPATIENT
Start: 2025-01-15 | End: 2025-01-15

## 2025-01-15 RX ORDER — METOPROLOL TARTRATE 25 MG/1
25 TABLET, FILM COATED ORAL ONCE
OUTPATIENT
Start: 2025-01-15

## 2025-01-15 RX ORDER — IVABRADINE 5 MG/1
15 TABLET, FILM COATED ORAL ONCE
OUTPATIENT
Start: 2025-01-15 | End: 2025-01-15

## 2025-01-15 RX ORDER — METOPROLOL TARTRATE 100 MG/1
TABLET ORAL
Qty: 2 TABLET | Refills: 0 | Status: SHIPPED | OUTPATIENT
Start: 2025-01-15

## 2025-01-15 RX ORDER — METOPROLOL TARTRATE 25 MG/1
150 TABLET, FILM COATED ORAL ONCE
OUTPATIENT
Start: 2025-01-15

## 2025-01-15 RX ORDER — METOPROLOL TARTRATE 25 MG/1
100 TABLET, FILM COATED ORAL ONCE
OUTPATIENT
Start: 2025-01-15

## 2025-01-15 RX ORDER — SODIUM CHLORIDE 0.9 % (FLUSH) 0.9 %
10 SYRINGE (ML) INJECTION AS NEEDED
OUTPATIENT
Start: 2025-01-15

## 2025-01-15 NOTE — PROGRESS NOTES
[facility] Cardiology  Consultation H&P  Ananda Tobin  1980  [phone]  There is no work phone number on file..    VISIT DATE:  01/15/2025    PCP: Solitaroi Guzman MD  [address] [place] [address]    CC:  Chief Complaint   Patient presents with    Chest Pain         ASSESSMENT:   Diagnosis Plan   1. Precordial pain  CT Angiogram Coronary    CT Angiogram Coronary-Cardiology Interpretation    metoprolol tartrate (LOPRESSOR) tablet 200 mg    metoprolol tartrate (LOPRESSOR) tablet 150 mg    metoprolol tartrate (LOPRESSOR) tablet 100 mg    metoprolol tartrate (LOPRESSOR) tablet 50 mg    metoprolol tartrate (LOPRESSOR) tablet 25 mg    metoprolol tartrate (LOPRESSOR) tablet 50 mg    metoprolol tartrate (LOPRESSOR) injection 5 mg    nitroglycerin (NITROSTAT) SL tablet 0.4 mg    nitroglycerin (NITROSTAT) SL tablet 0.8 mg    ivabradine HCl (CORLANOR) tablet 15 mg    No Caffeine or Nicotine 4 Hours Prior to CTA Appointment    Nothing to Eat or Drink 4 Hours Prior to CTA Appointment    Do Not Take Phosphodiasterase Inhibitors in the 72 Hours Prior to Coronary CTA    Obtain Informed Consent - Computed Tomography Angiography of Chest - Angiogram of Coronary Arteries    Vital Signs Upon Arrival    Cardiac Monitoring    Verify NPO Status - Patient to Be NPO at Least 4 Hours Prior to CTA    Notify CT After Administration of metoprolol tartrate (LOPRESSOR) tablet    Notify Provider If Total Metoprolol Given Equals 300mg & Heart Rate Not At Goal    Notify Provider Prior to Administration of Nitroglycerin if Patient SBP <80    POC Creatinine    Insert Peripheral IV    Saline Lock & Maintain IV Access    sodium chloride 0.9 % flush 10 mL    sodium chloride 0.9 % flush 10 mL    sodium chloride 0.9 % infusion 40 mL    Vital Signs - See Instructions    Hold Medication Metformin (Glucophage, Glucophage XR, Fortament, Glumetza);  Metglip (metformin/glipizide);  Glucovance (metformin/glyburide);  "Avandamet (metformin/rosiglitazone)    Patient May Discharge Home After Procedure Complete (If Stable)    metoprolol tartrate (LOPRESSOR) 100 MG tablet            PLAN:  Coronary CTA.    History of Present Illness   44-year-old gentleman presenting with precordial chest discomfort.  Has lost approximately 50 pounds on combination of Mounjaro, dietary modifications and exercise.  Exercising on a regular basis using moderate intensity interval training.  Has consistent focal regions of left anterior and left lateral chest wall pain at a consistent level of exertion.  Typically when his heart rate gets into the 140 to 150 bpm range.  If he decreases his exertion or rest his pain dissipates.  Does not experience chest pain at any other time.  Blood pressures run less than 120/80 mmHg.  Normal baseline twelve-lead EKG.  Normal cardiopulmonary exam.  Being evaluated as a kidney donor for his father.    PHYSICAL EXAMINATION:  Vitals:    01/15/25 1317   BP: 120/78   BP Location: Left arm   Patient Position: Sitting   Cuff Size: Adult   Pulse: 71   SpO2: 96%   Weight: 83.3 kg (183 lb 9.6 oz)   Height: 177.8 cm (70\")     General Appearance:    Alert, cooperative, no distress, appears stated age   Head:    Normocephalic, without obvious abnormality, atraumatic   Eyes:    conjunctiva/corneas clear, EOM's intact, fundi     benign, both eyes   Ears:    Normal TM's and external ear canals, both ears   Nose:   Nares normal, septum midline, mucosa normal, no drainage    or sinus tenderness   Throat:   Lips, mucosa, and tongue normal; teeth and gums normal   Neck:   Supple, symmetrical, trachea midline, no adenopathy;     thyroid:  no enlargement/tenderness/nodules; no carotid    bruit or JVD   Back:     Symmetric, no curvature, ROM normal, no CVA tenderness   Lungs:     Clear to auscultation bilaterally, respirations unlabored   Chest Wall:    No tenderness or deformity    Heart:    Regular rate and rhythm, S1 and S2 normal, no " "murmur, rub   or gallop, normal carotid impulse bilaterally without bruit.   Abdomen:     Soft, non-tender, bowel sounds active all four quadrants,     no masses, no organomegaly   Extremities:   Extremities normal, atraumatic, no cyanosis or edema   Pulses:   2+ and symmetric all extremities   Skin:   Skin color, texture, turgor normal, no rashes or lesions   Lymph nodes:   Cervical, supraclavicular, and axillary nodes normal   Neurologic:   normal strength, sensation intact     throughout       Diagnostic Data:    ECG 12 Lead    Date/Time: 1/15/2025 1:43 PM  Performed by: Rome Watson III, MD    Authorized by: Rome Watson III, MD  Previous ECG: no previous ECG available  Rhythm: sinus rhythm    Clinical impression: normal ECG        No results found for: \"CHLPL\", \"TRIG\", \"HDL\", \"LDLDIRECT\"  No results found for: \"GLUCOSE\", \"BUN\", \"CREATININE\", \"NA\", \"K\", \"CL\", \"CO2\"  Lab Results   Component Value Date    HGBA1C 5.1 07/09/2024     Lab Results   Component Value Date    WBC 8.61 07/09/2024    HGB 17.3 07/09/2024    HCT 53.1 (H) 07/09/2024     07/09/2024       PROBLEM LIST:  Patient Active Problem List   Diagnosis    Dyspepsia    Belching    Bloating    History of constipation    Dysphagia       PAST MEDICAL HX  Past Medical History:   Diagnosis Date    Hypertension        Allergies  No Known Allergies    Current Medications    Current Outpatient Medications:     Alcohol Swabs pads, Clean area prior to injection, Disp: 100 each, Rfl: 11    Mounjaro 7.5 MG/0.5ML solution auto-injector, Inject 7.5 mg under the skin into the appropriate area as directed 1 (One) Time Per Week., Disp: , Rfl:     Needle, Disp, (BD Disp Needle) 23G X 1\" misc, Use to inject one time weekly, Disp: 50 each, Rfl: 2    Needle, Disp, 18G X 1-1/2\" misc, Use for drawing up the medication, Disp: 50 each, Rfl: 3    Syringe, Disposable, 3 ML misc, Use 1 syringe 1 (One) Time Per Week., Disp: 100 each, Rfl: 11    Testosterone Cypionate " (DEPOTESTOTERONE CYPIONATE) 200 MG/ML injection, Inject 0.5 mL into the appropriate muscle as directed by prescriber Every 7 (Seven) Days. Waste remainder in vial. Please call for refills with last injection, Disp: 4 mL, Rfl: 0    metoprolol tartrate (LOPRESSOR) 100 MG tablet, Take 100 mg at Bedtime the Night Before Coronary CTA Appointment and In the Morning 1 Hour Prior to Coronary CTA Appointment. Do not take if heart rate less than 60., Disp: 2 tablet, Rfl: 0         ROS  ROS      SOCIAL HX  Social History     Socioeconomic History    Marital status:    Tobacco Use    Smoking status: Never     Passive exposure: Never    Smokeless tobacco: Never   Vaping Use    Vaping status: Never Used   Substance and Sexual Activity    Alcohol use: No    Drug use: No    Sexual activity: Yes     Partners: Female     Birth control/protection: Vasectomy       FAMILY HX  Family History   Problem Relation Age of Onset    Hypertension Father     Kidney disease Father              Rome Watson III, MD, FACC

## 2025-02-10 DIAGNOSIS — E29.1 HYPOGONADISM IN MALE: ICD-10-CM

## 2025-02-10 RX ORDER — NEEDLES, DISPOSABLE 25GX5/8"
NEEDLE, DISPOSABLE MISCELLANEOUS
Qty: 50 EACH | Refills: 2 | Status: SHIPPED | OUTPATIENT
Start: 2025-02-10

## 2025-02-10 RX ORDER — BLOOD PRESSURE TEST KIT
KIT MISCELLANEOUS
Qty: 100 EACH | Refills: 11 | Status: SHIPPED | OUTPATIENT
Start: 2025-02-10

## 2025-02-10 RX ORDER — TESTOSTERONE CYPIONATE 200 MG/ML
100 INJECTION, SOLUTION INTRAMUSCULAR
Qty: 4 ML | Refills: 0 | Status: SHIPPED | OUTPATIENT
Start: 2025-02-10

## 2025-02-10 NOTE — TELEPHONE ENCOUNTER
Patient is compliant with TRT testosterone replacement policy    Last OV visit 12/10/2024         Last LABS 12/05/2024           Next scheduled OV visit 06/11/2025    Refill due 01/07/2025    Confirmed pharmacy [facility]

## 2025-02-11 ENCOUNTER — TELEPHONE (OUTPATIENT)
Facility: HOSPITAL | Age: 45
End: 2025-02-11
Payer: COMMERCIAL

## 2025-02-11 NOTE — TELEPHONE ENCOUNTER
Pt contacted as pre-procedure phone call prior to planned CTA coronary for 2/12/25. Reviewed with patient arrival time of 1430 to main registration,  no caffeine after midnight, please take premedications night before and morning of procedure with a small sip of water as instructed,  recommended,  reviewed procedure instructions and allowed time for questions, and reviewed home medications, allergies, and medical history.

## 2025-02-12 ENCOUNTER — HOSPITAL ENCOUNTER (OUTPATIENT)
Facility: HOSPITAL | Age: 45
Discharge: HOME | End: 2025-02-12
Payer: COMMERCIAL

## 2025-02-12 VITALS
DIASTOLIC BLOOD PRESSURE: 85 MMHG | TEMPERATURE: 97.5 F | WEIGHT: 187.94 LBS | HEIGHT: 70 IN | SYSTOLIC BLOOD PRESSURE: 109 MMHG | OXYGEN SATURATION: 99 % | HEART RATE: 75 BPM | BODY MASS INDEX: 26.91 KG/M2 | RESPIRATION RATE: 20 BRPM

## 2025-02-12 DIAGNOSIS — R07.2 PRECORDIAL PAIN: ICD-10-CM

## 2025-02-12 PROCEDURE — 75574 CT ANGIO HRT W/3D IMAGE: CPT

## 2025-02-12 PROCEDURE — 25510000001 IOPAMIDOL PER 1 ML: Performed by: INTERNAL MEDICINE

## 2025-02-12 PROCEDURE — 75574 CT ANGIO HRT W/3D IMAGE: CPT | Performed by: INTERNAL MEDICINE

## 2025-02-12 RX ORDER — METOPROLOL TARTRATE 25 MG/1
25 TABLET, FILM COATED ORAL ONCE
Status: DISCONTINUED | OUTPATIENT
Start: 2025-02-12 | End: 2025-02-13 | Stop reason: HOSPADM

## 2025-02-12 RX ORDER — METOPROLOL TARTRATE 100 MG/1
200 TABLET ORAL ONCE
Status: DISCONTINUED | OUTPATIENT
Start: 2025-02-12 | End: 2025-02-13 | Stop reason: HOSPADM

## 2025-02-12 RX ORDER — SODIUM CHLORIDE 0.9 % (FLUSH) 0.9 %
10 SYRINGE (ML) INJECTION EVERY 12 HOURS SCHEDULED
Status: DISCONTINUED | OUTPATIENT
Start: 2025-02-12 | End: 2025-02-13 | Stop reason: HOSPADM

## 2025-02-12 RX ORDER — NITROGLYCERIN 0.4 MG/1
0.4 TABLET SUBLINGUAL
Status: COMPLETED | OUTPATIENT
Start: 2025-02-12 | End: 2025-02-12

## 2025-02-12 RX ORDER — POTASSIUM CITRATE 10 MEQ/1
10 TABLET, EXTENDED RELEASE ORAL
COMMUNITY

## 2025-02-12 RX ORDER — METOPROLOL TARTRATE 1 MG/ML
5 INJECTION, SOLUTION INTRAVENOUS
Status: DISCONTINUED | OUTPATIENT
Start: 2025-02-12 | End: 2025-02-13 | Stop reason: HOSPADM

## 2025-02-12 RX ORDER — NITROGLYCERIN 0.4 MG/1
0.8 TABLET SUBLINGUAL
Status: COMPLETED | OUTPATIENT
Start: 2025-02-12 | End: 2025-02-12

## 2025-02-12 RX ORDER — IOPAMIDOL 755 MG/ML
100 INJECTION, SOLUTION INTRAVASCULAR
Status: COMPLETED | OUTPATIENT
Start: 2025-02-12 | End: 2025-02-12

## 2025-02-12 RX ORDER — METOPROLOL TARTRATE 25 MG/1
50 TABLET, FILM COATED ORAL
Status: DISCONTINUED | OUTPATIENT
Start: 2025-02-12 | End: 2025-02-13 | Stop reason: HOSPADM

## 2025-02-12 RX ORDER — SODIUM CHLORIDE 9 MG/ML
40 INJECTION, SOLUTION INTRAVENOUS AS NEEDED
Status: DISCONTINUED | OUTPATIENT
Start: 2025-02-12 | End: 2025-02-13 | Stop reason: HOSPADM

## 2025-02-12 RX ORDER — SODIUM CHLORIDE 0.9 % (FLUSH) 0.9 %
10 SYRINGE (ML) INJECTION AS NEEDED
Status: DISCONTINUED | OUTPATIENT
Start: 2025-02-12 | End: 2025-02-13 | Stop reason: HOSPADM

## 2025-02-12 RX ORDER — IVABRADINE 5 MG/1
15 TABLET, FILM COATED ORAL ONCE
Status: DISCONTINUED | OUTPATIENT
Start: 2025-02-12 | End: 2025-02-13 | Stop reason: HOSPADM

## 2025-02-12 RX ORDER — METOPROLOL TARTRATE 100 MG/1
100 TABLET ORAL ONCE
Status: DISCONTINUED | OUTPATIENT
Start: 2025-02-12 | End: 2025-02-13 | Stop reason: HOSPADM

## 2025-02-12 RX ORDER — CHLORTHALIDONE 25 MG/1
12.5 TABLET ORAL DAILY
COMMUNITY

## 2025-02-12 RX ORDER — METOPROLOL TARTRATE 25 MG/1
50 TABLET, FILM COATED ORAL ONCE
Status: DISCONTINUED | OUTPATIENT
Start: 2025-02-12 | End: 2025-02-13 | Stop reason: HOSPADM

## 2025-02-12 RX ADMIN — NITROGLYCERIN 0.8 MG: 0.4 TABLET SUBLINGUAL at 14:58

## 2025-02-12 RX ADMIN — METOPROLOL TARTRATE 5 MG: 5 INJECTION INTRAVENOUS at 15:08

## 2025-02-12 RX ADMIN — IOPAMIDOL 65 ML: 755 INJECTION, SOLUTION INTRAVENOUS at 15:23

## 2025-02-12 RX ADMIN — METOPROLOL TARTRATE 5 MG: 5 INJECTION INTRAVENOUS at 14:32

## 2025-02-12 RX ADMIN — METOPROLOL TARTRATE 5 MG: 5 INJECTION INTRAVENOUS at 14:52

## 2025-03-26 DIAGNOSIS — E29.1 HYPOGONADISM IN MALE: ICD-10-CM

## 2025-03-31 DIAGNOSIS — E29.1 HYPOGONADISM IN MALE: ICD-10-CM

## 2025-03-31 RX ORDER — TESTOSTERONE CYPIONATE 200 MG/ML
INJECTION, SOLUTION INTRAMUSCULAR
Qty: 4 ML | Refills: 0 | Status: SHIPPED | OUTPATIENT
Start: 2025-03-31 | End: 2025-04-03 | Stop reason: SDUPTHER

## 2025-04-03 RX ORDER — TESTOSTERONE CYPIONATE 200 MG/ML
100 INJECTION, SOLUTION INTRAMUSCULAR
Qty: 4 ML | Refills: 0 | Status: SHIPPED | OUTPATIENT
Start: 2025-04-03

## 2025-04-03 RX ORDER — NEEDLES, DISPOSABLE 25GX5/8"
NEEDLE, DISPOSABLE MISCELLANEOUS
Qty: 50 EACH | Refills: 2 | Status: SHIPPED | OUTPATIENT
Start: 2025-04-03

## 2025-05-20 DIAGNOSIS — E29.1 HYPOGONADISM IN MALE: ICD-10-CM

## 2025-05-20 RX ORDER — TESTOSTERONE CYPIONATE 200 MG/ML
100 INJECTION, SOLUTION INTRAMUSCULAR
Qty: 4 ML | Refills: 0 | Status: SHIPPED | OUTPATIENT
Start: 2025-05-20

## 2025-05-20 RX ORDER — NEEDLES, DISPOSABLE 25GX5/8"
NEEDLE, DISPOSABLE MISCELLANEOUS
Qty: 50 EACH | Refills: 2 | Status: SHIPPED | OUTPATIENT
Start: 2025-05-20

## 2025-05-28 ENCOUNTER — LAB (OUTPATIENT)
Dept: LAB | Facility: HOSPITAL | Age: 45
End: 2025-05-28
Payer: COMMERCIAL

## 2025-05-28 DIAGNOSIS — E29.1 HYPOGONADISM IN MALE: ICD-10-CM

## 2025-05-28 LAB
ESTRADIOL SERPL HS-MCNC: 37.4 PG/ML
HCT VFR BLD AUTO: 52 % (ref 37.5–51)
HGB BLD-MCNC: 17.4 G/DL (ref 13–17.7)
TESTOST SERPL-MCNC: 574 NG/DL (ref 249–836)

## 2025-05-28 PROCEDURE — 82670 ASSAY OF TOTAL ESTRADIOL: CPT

## 2025-05-28 PROCEDURE — 85014 HEMATOCRIT: CPT

## 2025-05-28 PROCEDURE — 85018 HEMOGLOBIN: CPT

## 2025-05-28 PROCEDURE — 84403 ASSAY OF TOTAL TESTOSTERONE: CPT

## 2025-05-28 PROCEDURE — 36415 COLL VENOUS BLD VENIPUNCTURE: CPT

## 2025-06-11 ENCOUNTER — OFFICE VISIT (OUTPATIENT)
Dept: UROLOGY | Facility: CLINIC | Age: 45
End: 2025-06-11
Payer: COMMERCIAL

## 2025-06-11 ENCOUNTER — TELEPHONE (OUTPATIENT)
Dept: UROLOGY | Facility: CLINIC | Age: 45
End: 2025-06-11

## 2025-06-11 VITALS
TEMPERATURE: 97.6 F | DIASTOLIC BLOOD PRESSURE: 80 MMHG | HEIGHT: 70 IN | SYSTOLIC BLOOD PRESSURE: 118 MMHG | OXYGEN SATURATION: 98 % | HEART RATE: 78 BPM | WEIGHT: 187 LBS | BODY MASS INDEX: 26.77 KG/M2

## 2025-06-11 DIAGNOSIS — D75.1 POLYCYTHEMIA: ICD-10-CM

## 2025-06-11 DIAGNOSIS — E29.1 HYPOGONADISM IN MALE: Primary | ICD-10-CM

## 2025-06-11 PROBLEM — Z00.5 TRANSPLANT DONOR EVALUATION: Status: ACTIVE | Noted: 2025-02-15

## 2025-06-11 PROBLEM — N20.0 NEPHROLITHIASIS: Status: ACTIVE | Noted: 2025-02-15

## 2025-06-11 PROBLEM — R82.994 HYPERCALCIURIA: Status: ACTIVE | Noted: 2025-02-04

## 2025-06-11 NOTE — PROGRESS NOTES
Office Visit     Patient Name: Ananda Tobin  : 1980   MRN: [id number]     Patient or patient representative verbalized consent for the use of Ambient Listening during the visit with  REYMUNDO Parsons for chart documentation. 2025  08:49 EDT    Chief Complaint:   Chief Complaint   Patient presents with    Follow-up     Hypogonadism in male         Referring Provider: No ref. provider found    Primary Care Provider: Solitario Guzman MD     History of Present Illness  The patient presents for evaluation of testosterone therapy and polycythemia.    Testosterone Therapy and Polycythemia  He reports no adverse effects from his testosterone therapy, such as chest tenderness, swelling, acne, or mood swings. He is currently on 100 mg of testosterone per week. He may need to temporarily discontinue this treatment during his upcoming kidney transplant procedure.  - Medication: 100 mg of testosterone per week.  - Alleviating/Aggravating Factors: No adverse effects reported.  - Timing: May need to temporarily discontinue during upcoming kidney transplant procedure.    Kidney Transplant Evaluation  He was informed by a specialist in the [place] that his levels were high for kidney stones and was placed on medication, which he does not remember. He was called back by the [place] this past Monday and told his numbers are now perfect, making him a candidate to donate his kidney. He is scheduled for all-day testing on 2025.  - Onset: Informed by a specialist in the [place] about high levels for kidney stones.  - Medication: Placed on medication (name not remembered).  - Severity: Numbers now perfect, making him a candidate to donate his kidney.    MEDICATIONS  Current: testosterone     Subjective   Review of System:   As noted in HPI.    Past Medical History:   Diagnosis Date    Hypertension      Past Surgical History:   Procedure Laterality Date    CHOLECYSTECTOMY      COLONOSCOPY      HERNIA REPAIR    "    Family History   Problem Relation Age of Onset    Hypertension Father     Kidney disease Father      Social History     Socioeconomic History    Marital status:    Tobacco Use    Smoking status: Never     Passive exposure: Never    Smokeless tobacco: Never   Vaping Use    Vaping status: Never Used   Substance and Sexual Activity    Alcohol use: No    Drug use: No    Sexual activity: Yes     Partners: Female     Birth control/protection: Vasectomy       Current Outpatient Medications:     Alcohol Swabs pads, Clean area prior to injection, Disp: 100 each, Rfl: 11    chlorthalidone (HYGROTON) 25 MG tablet, Take 0.5 tablets by mouth Daily., Disp: , Rfl:     metoprolol tartrate (LOPRESSOR) 100 MG tablet, Take 100 mg at Bedtime the Night Before Coronary CTA Appointment and In the Morning 1 Hour Prior to Coronary CTA Appointment. Do not take if heart rate less than 60., Disp: 2 tablet, Rfl: 0    Mounjaro 7.5 MG/0.5ML solution auto-injector, Inject 7.5 mg under the skin into the appropriate area as directed 1 (One) Time Per Week., Disp: , Rfl:     Needle, Disp, (BD Disp Needle) 23G X 1\" misc, Use to inject one time weekly, Disp: 50 each, Rfl: 2    Needle, Disp, 18G X 1-1/2\" misc, Use for drawing up the medication, Disp: 50 each, Rfl: 3    potassium citrate (UROCIT-K) 10 MEQ (1080 MG) CR tablet, Take 1 tablet by mouth 2 (Two) Times a Day Before Meals., Disp: , Rfl:     Syringe, Disposable, 3 ML misc, Use 1 syringe 1 (One) Time Per Week., Disp: 100 each, Rfl: 11    Testosterone Cypionate (DEPOTESTOTERONE CYPIONATE) 200 MG/ML injection, Inject 0.5 mL into the appropriate muscle as directed by prescriber Every 7 (Seven) Days. Waste remainder in vial. Please call for refills with last injection, Disp: 4 mL, Rfl: 0    No Known Allergies  Objective   Visit Vitals  /80 (BP Location: Left arm, Patient Position: Sitting, Cuff Size: Adult)   Pulse 78   Temp 97.6 °F (36.4 °C) (Temporal)   Ht 177.8 cm (70\")   Wt 84.8 kg " "(187 lb)   SpO2 98%   BMI 26.83 kg/m²        Body mass index is 26.83 kg/m².     Physical Exam  Vitals and nursing note reviewed.   Constitutional:       General: He is not in acute distress.     Appearance: Normal appearance. He is well-groomed and normal weight. He is not ill-appearing.   Pulmonary:      Effort: Pulmonary effort is normal. No respiratory distress.   Skin:     General: Skin is warm and dry.   Neurological:      General: No focal deficit present.      Mental Status: He is alert and oriented to person, place, and time.   Psychiatric:         Mood and Affect: Mood normal.         Behavior: Behavior normal.          Labs  Lab Results   Component Value Date    COLORU Yellow 07/09/2024    CLARITYU Clear 07/09/2024    PHUR 6.5 07/09/2024    LEUKOCYTESUR Negative 07/09/2024    BILIRUBINUR Negative 07/09/2024      No results found for: \"WBCUA\", \"RBCUA\", \"BACTERIA\", \"HYALCASTU\", \"SQUAMEPIUA\"     Lab Results   Component Value Date    WBC 8.61 07/09/2024    HGB 17.4 05/28/2025    HCT 52.0 (H) 05/28/2025    MCV 89 07/09/2024     07/09/2024     No results found for: \"GLUCOSE\", \"CALCIUM\", \"NA\", \"K\", \"CO2\", \"CL\", \"BUN\", \"CREATININE\", \"EGFR\", \"BCR\", \"ANIONGAP\", \"ALT\", \"AST\"    Lab Results   Component Value Date    HGBA1C 5.1 07/09/2024        Lab Results   Component Value Date    PSA 0.94 07/09/2024    PSA 1.030 11/28/2023       No results found for: \"URICACIDSTN\", \"ZDJG7FYZUXQ\", \"GKYF6COVKJ\", \"LABMAGN\"  Lab Results   Component Value Date    URICACID 5 07/09/2024     No results found for: \"CYSTINE\", \"URINEVOLUM\", \"CALCIUMUR\", \"OXALATEU\", \"CITRATEUR\", \"LABPH\", \"URICUR\", \"NAUR\", \"KUR\", \"MAGNESIUMUR\", \"PHOSUR\", \"AMMONIUMUR\", \"CLUR\", \"[id number]\", \"UREAUR\", \"LABCREAUR\"    Lab Results   Component Value Date    PSA 0.94 07/09/2024    ESTRADIOL 37.4 05/28/2025       No results found for: \"FERRITIN\", \"FSH\", \"SEXMONB\", \"TSH\", \"FREET4\", \"T3FREE\", \"TPOABRFLX\", \"MGIPJDCV38\", \"QCDB41QM\", \"CORTISOL\", \"TLESTROGENS\", " "\"TESTOSTEROTT\", \"TESTFRE\", \"LIPIDEXCLUSI\"      Radiographic Studies  CT Angiogram Coronary  Result Date: 2/14/2025  Impression: No acute or suspicious extracardiac findings. Please refer to CT Angiogram Coronary   Cardiology Interp report for information on cardiac structures. Electronically Signed: Manan Nance MD  2/14/2025 3:46 PM EST  Workstation ID: [id number]    CT Angiogram Coronary-Cardiology Interpretation  Result Date: 2/12/2025  Normal coronary arteries (0%) . CAD RADS 0 Total calcium score 0 indicating absence of calcified coronary plaque No non-atherosclerotic coronary abnormalities Normal LV systolic function (calculated LVEF 61%) Please refer to the radiology report for information on non-cardiac structures.   RECOMMENDATION: Reassurance, no evidence of significant CAD       Assessment / Plan      Diagnoses and all orders for this visit:    1. Hypogonadism in male (Primary)  -     Hemoglobin & Hematocrit, Blood; Future  -     Testosterone; Future    2. Polycythemia  -     Hemoglobin & Hematocrit, Blood; Future         Assessment & Plan  1. Testosterone therapy  - Testosterone levels are therapeutic at 574  - Estradiol levels are normal at 37.4  - No symptoms reported  - Continue current dosage of 100 mg weekly  - Contact office if issues with prescription refills occur    2. Polycythemia  - Hematocrit is elevated at 52, slightly lower than three months ago  - Elevated hematocrit increases risk of stroke, heart attack, and blood clots  - Will schedule therapeutic phlebotomy at [facility] in [place]    Follow-up  - Follow-up in 6 months       Return in about 6 months (around 12/11/2025) for Alba Walsh, MSN, APRN, FNP-C  [facility] [place]    "

## 2025-06-11 NOTE — TELEPHONE ENCOUNTER
Called Cara Limon at [facility] and [facility] to return my call.    Andry BARRETO

## 2025-06-11 NOTE — PROGRESS NOTES
"       Office Visit     Patient Name: Ananda Tobin  : 1980   MRN: [id number]     {CHRISS [employer] Provider Statement:[id number]}    Chief Complaint:   Chief Complaint   Patient presents with    Follow-up     Hypogonadism in male         Referring Provider: No ref. provider found    Primary Care Provider: Solitario Guzman MD     History of Present Illness         IPSS Questionnaire (AUA-7):                Subjective   Review of System:   As noted in HPI.    Past Medical History:   Diagnosis Date    Hypertension      Past Surgical History:   Procedure Laterality Date    CHOLECYSTECTOMY      COLONOSCOPY      HERNIA REPAIR       Family History   Problem Relation Age of Onset    Hypertension Father     Kidney disease Father      Social History     Socioeconomic History    Marital status:    Tobacco Use    Smoking status: Never     Passive exposure: Never    Smokeless tobacco: Never   Vaping Use    Vaping status: Never Used   Substance and Sexual Activity    Alcohol use: No    Drug use: No    Sexual activity: Yes     Partners: Female     Birth control/protection: Vasectomy       Current Outpatient Medications:     Alcohol Swabs pads, Clean area prior to injection, Disp: 100 each, Rfl: 11    chlorthalidone (HYGROTON) 25 MG tablet, Take 0.5 tablets by mouth Daily., Disp: , Rfl:     metoprolol tartrate (LOPRESSOR) 100 MG tablet, Take 100 mg at Bedtime the Night Before Coronary CTA Appointment and In the Morning 1 Hour Prior to Coronary CTA Appointment. Do not take if heart rate less than 60., Disp: 2 tablet, Rfl: 0    Mounjaro 7.5 MG/0.5ML solution auto-injector, Inject 7.5 mg under the skin into the appropriate area as directed 1 (One) Time Per Week., Disp: , Rfl:     Needle, Disp, (BD Disp Needle) 23G X 1\" misc, Use to inject one time weekly, Disp: 50 each, Rfl: 2    Needle, Disp, 18G X 1-1/2\" misc, Use for drawing up the medication, Disp: 50 each, Rfl: 3    potassium citrate (UROCIT-K) 10 MEQ (1080 MG) CR " "tablet, Take 1 tablet by mouth 2 (Two) Times a Day Before Meals., Disp: , Rfl:     Syringe, Disposable, 3 ML misc, Use 1 syringe 1 (One) Time Per Week., Disp: 100 each, Rfl: 11    Testosterone Cypionate (DEPOTESTOTERONE CYPIONATE) 200 MG/ML injection, Inject 0.5 mL into the appropriate muscle as directed by prescriber Every 7 (Seven) Days. Waste remainder in vial. Please call for refills with last injection, Disp: 4 mL, Rfl: 0    No Known Allergies  Objective   Visit Vitals  /80 (BP Location: Left arm, Patient Position: Sitting, Cuff Size: Adult)   Pulse 78   Temp 97.6 °F (36.4 °C) (Temporal)   Ht 177.8 cm (70\")   Wt 84.8 kg (187 lb)   SpO2 98%   BMI 26.83 kg/m²        Body mass index is 26.83 kg/m².     Physical Exam ***    Labs  Lab Results   Component Value Date    COLORU Yellow 07/09/2024    CLARITYU Clear 07/09/2024    PHUR 6.5 07/09/2024    LEUKOCYTESUR Negative 07/09/2024    BILIRUBINUR Negative 07/09/2024      No results found for: \"WBCUA\", \"RBCUA\", \"BACTERIA\", \"HYALCASTU\", \"SQUAMEPIUA\"     Lab Results   Component Value Date    WBC 8.61 07/09/2024    HGB 17.4 05/28/2025    HCT 52.0 (H) 05/28/2025    MCV 89 07/09/2024     07/09/2024     No results found for: \"GLUCOSE\", \"CALCIUM\", \"NA\", \"K\", \"CO2\", \"CL\", \"BUN\", \"CREATININE\", \"EGFR\", \"BCR\", \"ANIONGAP\", \"ALT\", \"AST\"    Lab Results   Component Value Date    HGBA1C 5.1 07/09/2024        Lab Results   Component Value Date    PSA 0.94 07/09/2024    PSA 1.030 11/28/2023       No results found for: \"URICACIDSTN\", \"IOJQ7EDWZVU\", \"FSZU1EYSYT\", \"LABMAGN\"  Lab Results   Component Value Date    URICACID 5 07/09/2024     No results found for: \"CYSTINE\", \"URINEVOLUM\", \"CALCIUMUR\", \"OXALATEU\", \"CITRATEUR\", \"LABPH\", \"URICUR\", \"NAUR\", \"KUR\", \"MAGNESIUMUR\", \"PHOSUR\", \"AMMONIUMUR\", \"CLUR\", \"SKWPSCODG34V\", \"UREAUR\", \"LABCREAUR\"    Lab Results   Component Value Date    PSA 0.94 07/09/2024    ESTRADIOL 37.4 05/28/2025       No results found for: \"FERRITIN\", \"FSH\", " "\"SEXMONB\", \"TSH\", \"FREET4\", \"T3FREE\", \"TPOABRFLX\", \"MRVECIRI70\", \"ZVIE15LL\", \"CORTISOL\", \"TLESTROGENS\", \"TESTOSTEROTT\", \"TESTFRE\", \"LIPIDEXCLUSI\"      Radiographic Studies  CT Angiogram Coronary  Result Date: 2/14/2025  Impression: No acute or suspicious extracardiac findings. Please refer to CT Angiogram Coronary   Cardiology Interp report for information on cardiac structures. Electronically Signed: Manan Nance MD  2/14/2025 3:46 PM EST  Workstation ID: [id number]    CT Angiogram Coronary-Cardiology Interpretation  Result Date: 2/12/2025  Normal coronary arteries (0%) . CAD RADS 0 Total calcium score 0 indicating absence of calcified coronary plaque No non-atherosclerotic coronary abnormalities Normal LV systolic function (calculated LVEF 61%) Please refer to the radiology report for information on non-cardiac structures.   RECOMMENDATION: Reassurance, no evidence of significant CAD       I have reviewed the above labs and imaging. ***    PVR  Post-void residual performed with ultrasound by staff and interpreted by me - *** mL    Assessment / Plan      There are no diagnoses linked to this encounter.     Assessment & Plan         No follow-ups on file.    Nico, MSN, APRN, FNP-C  [facility]    "

## 2025-06-30 ENCOUNTER — TELEPHONE (OUTPATIENT)
Dept: UROLOGY | Facility: CLINIC | Age: 45
End: 2025-06-30

## 2025-06-30 DIAGNOSIS — E29.1 HYPOGONADISM IN MALE: ICD-10-CM

## 2025-06-30 RX ORDER — TESTOSTERONE CYPIONATE 200 MG/ML
100 INJECTION, SOLUTION INTRAMUSCULAR
Qty: 4 ML | Refills: 0 | Status: SHIPPED | OUTPATIENT
Start: 2025-06-30

## 2025-06-30 RX ORDER — NEEDLES, DISPOSABLE 25GX5/8"
NEEDLE, DISPOSABLE MISCELLANEOUS
Qty: 50 EACH | Refills: 2 | Status: SHIPPED | OUTPATIENT
Start: 2025-06-30

## 2025-06-30 NOTE — TELEPHONE ENCOUNTER
Hub staff attempted to follow warm transfer process and was unsuccessful     Caller: Ananda Tobin    Relationship to patient: Self    Best call back number: [phone]    Patient is needing: PT HAS NOT HEARD FROM THE BLOOD CENTER. HE WAS TOLD TO CALL [facility] IF THIS HAPPENED. PLEASE CALL PT TO ADVISE. THANK YOU

## 2025-08-08 DIAGNOSIS — E29.1 HYPOGONADISM IN MALE: ICD-10-CM

## 2025-08-12 RX ORDER — TESTOSTERONE CYPIONATE 200 MG/ML
100 INJECTION, SOLUTION INTRAMUSCULAR
Qty: 4 ML | Refills: 0 | Status: SHIPPED | OUTPATIENT
Start: 2025-08-12

## 2025-08-12 RX ORDER — NEEDLES, DISPOSABLE 25GX5/8"
NEEDLE, DISPOSABLE MISCELLANEOUS
Qty: 50 EACH | Refills: 2 | Status: SHIPPED | OUTPATIENT
Start: 2025-08-12

## 2025-08-12 RX ORDER — BLOOD PRESSURE TEST KIT
KIT MISCELLANEOUS
Qty: 100 EACH | Refills: 11 | Status: SHIPPED | OUTPATIENT
Start: 2025-08-12